# Patient Record
Sex: FEMALE | URBAN - NONMETROPOLITAN AREA
[De-identification: names, ages, dates, MRNs, and addresses within clinical notes are randomized per-mention and may not be internally consistent; named-entity substitution may affect disease eponyms.]

---

## 2021-06-17 ENCOUNTER — APPOINTMENT (RX ONLY)
Dept: URBAN - NONMETROPOLITAN AREA CLINIC 13 | Facility: CLINIC | Age: 41
Setting detail: DERMATOLOGY
End: 2021-06-17

## 2021-06-17 DIAGNOSIS — L92.0 GRANULOMA ANNULARE: ICD-10-CM

## 2021-06-17 DIAGNOSIS — L30.8 OTHER SPECIFIED DERMATITIS: ICD-10-CM

## 2021-06-17 PROCEDURE — ? ADDITIONAL NOTES

## 2021-06-17 PROCEDURE — 99203 OFFICE O/P NEW LOW 30 MIN: CPT

## 2021-06-17 PROCEDURE — ? COUNSELING

## 2021-06-17 PROCEDURE — ? PRESCRIPTION

## 2021-06-17 RX ORDER — CLOBETASOL PROPIONATE 0.5 MG/G
AAA CREAM TOPICAL BID
Qty: 60 | Refills: 0 | Status: ERX | COMMUNITY
Start: 2021-06-17

## 2021-06-17 RX ADMIN — CLOBETASOL PROPIONATE AAA: 0.5 CREAM TOPICAL at 00:00

## 2021-06-17 ASSESSMENT — LOCATION ZONE DERM
LOCATION ZONE: LEG
LOCATION ZONE: LEG
LOCATION ZONE: HAND

## 2021-06-17 ASSESSMENT — LOCATION SIMPLE DESCRIPTION DERM
LOCATION SIMPLE: RIGHT CALF
LOCATION SIMPLE: LEFT PRETIBIAL REGION
LOCATION SIMPLE: LEFT HAND
LOCATION SIMPLE: LEFT PRETIBIAL REGION

## 2021-06-17 ASSESSMENT — LOCATION DETAILED DESCRIPTION DERM
LOCATION DETAILED: LEFT DISTAL PRETIBIAL REGION
LOCATION DETAILED: LEFT DISTAL PRETIBIAL REGION
LOCATION DETAILED: LEFT DORSAL RING FINGER METACARPOPHALANGEAL JOINT
LOCATION DETAILED: RIGHT DISTAL CALF

## 2021-06-17 NOTE — PROCEDURE: COUNSELING
Detail Level: Detailed
Patient Specific Counseling (Will Not Stick From Patient To Patient): WILL USE THE CLOBETASOL 0.5% CREAM BID X 2 WEEKS PRN FLARE, GIVEN FOR HER GRANULOMA ANNULARE

## 2021-06-17 NOTE — HPI: OTHER
Condition:: Rash
Please Describe Your Condition:: Rash, left ankle x 1 ear now on the right calf. No itch. Just red. Treated with Triamcinolone 0.1% cream and Betamethasone .05% but that burned.

## 2021-06-17 NOTE — PROCEDURE: ADDITIONAL NOTES
Additional Notes: Will do 3 cycles of Clobetasol cream and RTC in 3 months for ILK if no improvement.
Detail Level: Simple
Render Risk Assessment In Note?: no